# Patient Record
Sex: FEMALE | Race: BLACK OR AFRICAN AMERICAN | Employment: FULL TIME | URBAN - METROPOLITAN AREA
[De-identification: names, ages, dates, MRNs, and addresses within clinical notes are randomized per-mention and may not be internally consistent; named-entity substitution may affect disease eponyms.]

---

## 2023-02-11 ENCOUNTER — APPOINTMENT (OUTPATIENT)
Dept: CT IMAGING | Age: 38
End: 2023-02-11
Attending: NURSE PRACTITIONER
Payer: COMMERCIAL

## 2023-02-11 ENCOUNTER — HOSPITAL ENCOUNTER (EMERGENCY)
Age: 38
Discharge: HOME | End: 2023-02-11
Attending: EMERGENCY MEDICINE
Payer: COMMERCIAL

## 2023-02-11 VITALS
DIASTOLIC BLOOD PRESSURE: 96 MMHG | BODY MASS INDEX: 35.52 KG/M2 | TEMPERATURE: 97.9 F | OXYGEN SATURATION: 98 % | RESPIRATION RATE: 14 BRPM | SYSTOLIC BLOOD PRESSURE: 141 MMHG | WEIGHT: 221 LBS | HEART RATE: 87 BPM | HEIGHT: 66 IN

## 2023-02-11 DIAGNOSIS — L02.215 PERINEAL ABSCESS: Primary | ICD-10-CM

## 2023-02-11 LAB
ALBUMIN SERPL-MCNC: 3.9 G/DL (ref 3.5–5.2)
ALBUMIN/GLOB SERPL: 1.1 (ref 1.1–2.2)
ALP SERPL-CCNC: 57 U/L (ref 35–104)
ALT SERPL-CCNC: 8 U/L (ref 10–35)
ANION GAP SERPL CALC-SCNC: 10 MMOL/L (ref 5–15)
AST SERPL-CCNC: 11 U/L (ref 10–35)
BASOPHILS # BLD: 0 K/UL (ref 0–1)
BASOPHILS NFR BLD: 0 % (ref 0–1)
BILIRUB SERPL-MCNC: 0.3 MG/DL (ref 0.2–1)
BUN SERPL-MCNC: 13 MG/DL (ref 6–20)
BUN/CREAT SERPL: 18 (ref 12–20)
CALCIUM SERPL-MCNC: 9.1 MG/DL (ref 8.6–10)
CHLORIDE SERPL-SCNC: 104 MMOL/L (ref 98–107)
CO2 SERPL-SCNC: 28 MMOL/L (ref 22–29)
CREAT SERPL-MCNC: 0.71 MG/DL (ref 0.5–0.9)
DIFFERENTIAL METHOD BLD: ABNORMAL
EOSINOPHIL # BLD: 0.3 K/UL (ref 0–0.4)
EOSINOPHIL NFR BLD: 4 %
ERYTHROCYTE [DISTWIDTH] IN BLOOD BY AUTOMATED COUNT: 16.8 % (ref 11.5–14.5)
GLOBULIN SER CALC-MCNC: 3.6 G/DL (ref 2–4)
GLUCOSE SERPL-MCNC: 128 MG/DL (ref 65–100)
HCT VFR BLD AUTO: 39.3 % (ref 35–47)
HGB BLD-MCNC: 11.9 G/DL (ref 11.5–16)
IMM GRANULOCYTES # BLD AUTO: 0.1 K/UL (ref 0–0.04)
IMM GRANULOCYTES NFR BLD AUTO: 1 % (ref 0–0.5)
LYMPHOCYTES # BLD: 3.2 K/UL (ref 0.8–3.5)
LYMPHOCYTES NFR BLD: 41 % (ref 12–49)
MCH RBC QN AUTO: 24.2 PG (ref 26–34)
MCHC RBC AUTO-ENTMCNC: 30.3 G/DL (ref 30–36.5)
MCV RBC AUTO: 80 FL (ref 80–99)
MONOCYTES # BLD: 0.9 K/UL (ref 0–1)
MONOCYTES NFR BLD: 11 % (ref 5–13)
NEUTS SEG # BLD: 3.3 K/UL (ref 1.8–8)
NEUTS SEG NFR BLD: 43 % (ref 32–75)
NRBC # BLD: 0 K/UL (ref 0–0.01)
NRBC BLD-RTO: 0 PER 100 WBC
PLATELET # BLD AUTO: 282 K/UL (ref 150–400)
PMV BLD AUTO: 9.5 FL (ref 8.9–12.9)
POTASSIUM SERPL-SCNC: 4.6 MMOL/L (ref 3.5–5.1)
PROT SERPL-MCNC: 7.5 G/DL (ref 6.4–8.3)
RBC # BLD AUTO: 4.91 M/UL (ref 3.8–5.2)
SODIUM SERPL-SCNC: 142 MMOL/L (ref 136–145)
WBC # BLD AUTO: 7.7 K/UL (ref 3.6–11)

## 2023-02-11 PROCEDURE — 74011250637 HC RX REV CODE- 250/637: Performed by: NURSE PRACTITIONER

## 2023-02-11 PROCEDURE — 85025 COMPLETE CBC W/AUTO DIFF WBC: CPT

## 2023-02-11 PROCEDURE — 36415 COLL VENOUS BLD VENIPUNCTURE: CPT

## 2023-02-11 PROCEDURE — 74011000250 HC RX REV CODE- 250: Performed by: NURSE PRACTITIONER

## 2023-02-11 PROCEDURE — 72193 CT PELVIS W/DYE: CPT

## 2023-02-11 PROCEDURE — 96374 THER/PROPH/DIAG INJ IV PUSH: CPT

## 2023-02-11 PROCEDURE — 74011250636 HC RX REV CODE- 250/636: Performed by: NURSE PRACTITIONER

## 2023-02-11 PROCEDURE — 80053 COMPREHEN METABOLIC PANEL: CPT

## 2023-02-11 PROCEDURE — 99285 EMERGENCY DEPT VISIT HI MDM: CPT

## 2023-02-11 PROCEDURE — 74011000636 HC RX REV CODE- 636: Performed by: EMERGENCY MEDICINE

## 2023-02-11 RX ORDER — DOXYCYCLINE HYCLATE 100 MG
100 TABLET ORAL 2 TIMES DAILY
Qty: 14 TABLET | Refills: 0 | Status: SHIPPED | OUTPATIENT
Start: 2023-02-11 | End: 2023-02-18

## 2023-02-11 RX ORDER — DOCUSATE SODIUM 100 MG/1
100 CAPSULE, LIQUID FILLED ORAL 2 TIMES DAILY
Qty: 30 CAPSULE | Refills: 0 | Status: SHIPPED | OUTPATIENT
Start: 2023-02-11 | End: 2023-02-26

## 2023-02-11 RX ORDER — KETOROLAC TROMETHAMINE 30 MG/ML
15 INJECTION, SOLUTION INTRAMUSCULAR; INTRAVENOUS
Status: COMPLETED | OUTPATIENT
Start: 2023-02-11 | End: 2023-02-11

## 2023-02-11 RX ORDER — ACETAMINOPHEN 500 MG
1000 TABLET ORAL ONCE
Status: COMPLETED | OUTPATIENT
Start: 2023-02-11 | End: 2023-02-11

## 2023-02-11 RX ORDER — OXYCODONE HYDROCHLORIDE 10 MG/1
5 TABLET ORAL
Qty: 10 TABLET | Refills: 0 | Status: SHIPPED | OUTPATIENT
Start: 2023-02-11 | End: 2023-02-16

## 2023-02-11 RX ORDER — IBUPROFEN 600 MG/1
600 TABLET ORAL
Qty: 20 TABLET | Refills: 0 | Status: SHIPPED | OUTPATIENT
Start: 2023-02-11

## 2023-02-11 RX ORDER — DOXYCYCLINE HYCLATE 100 MG
100 TABLET ORAL EVERY 12 HOURS
Status: DISCONTINUED | OUTPATIENT
Start: 2023-02-11 | End: 2023-02-11 | Stop reason: HOSPADM

## 2023-02-11 RX ADMIN — Medication 3 ML: at 16:40

## 2023-02-11 RX ADMIN — KETOROLAC TROMETHAMINE 15 MG: 30 INJECTION, SOLUTION INTRAMUSCULAR; INTRAVENOUS at 16:00

## 2023-02-11 RX ADMIN — IOPAMIDOL 100 ML: 755 INJECTION, SOLUTION INTRAVENOUS at 16:11

## 2023-02-11 RX ADMIN — ACETAMINOPHEN 1000 MG: 500 TABLET ORAL at 15:58

## 2023-02-11 RX ADMIN — DOXYCYCLINE HYCLATE 100 MG: 100 TABLET, COATED ORAL at 18:31

## 2023-02-11 NOTE — Clinical Note
[address]  [facility] EMERGENCY DEPT  [address] [id number]  [phone]    Work/School Note    Date: 2/11/2023    To Whom It May concern:    Vivi Elaine was seen and treated today in the emergency room by the following provider(s):  Attending Provider: Blake Flower DO  Nurse Practitioner: Joel Mcgowan, Mira Goltz, NP. Vivi Elaine is excused from work/school on 02/11/23 and 02/12/23. She is medically clear to return to work/school on 2/13/2023.        Sincerely,          Darrell Cortes NP

## 2023-02-11 NOTE — ED NOTES
Pt given discharge instructions, patient education, 3 prescriptions and follow-up information. Pt verbalizing understanding. All questions answered. Pt discharge to home in private vehicle, ambulatory. Pt A&Ox4, RA, pain controlled.

## 2023-02-11 NOTE — DISCHARGE INSTRUCTIONS
You may alternate Tylenol with ibuprofen every 4 hours to help manage your pain. I have sent you home with a prescription for doxycycline this is an antibiotic please consider eating 1 yogurt once a day prior to taking antibiotic and take it fully as prescribed. Do not save the antibiotic. You may perform sitz bath's to help ease the discomfort 2-3 times daily to help encourage the abscess to drain. If you have any worsening of symptoms, fever, chills, intolerable pain please return to a main ER.

## 2023-02-11 NOTE — ED TRIAGE NOTES
Pt. Arrives to er with complaints of Hemorrhoid pain that started Wednesday. pt reports using preporation h, and witch hazel both causing more pain. Pt reports aching pain but when medication is applied it is more of a burning sensation. 6/10 pain.

## 2023-02-11 NOTE — ED PROVIDER NOTES
75-year-old female with no significant past medical history presents ambulatory to the emergency center for evaluation of hemorrhoid pain. Patient states that she noticed that on Wednesday, started using over-the-counter Preparation H and witch hazel which both made the hemorrhoid burn and more painful. Patient states that she then switched over to just using wet wipes after going to the bathroom. Denies difficulty with constipation, denies blood in her stool. Patient denies previous history of difficulty with hemorrhoids. Patient states that she had applied ice while she was at home which helped ease the pain, however she was unable to keep this on due to walking around at work. Patient states that on Thursday she felt like she had a fever/chills, did not take her temperature but states that she wiped herself down with alcohol and Friday felt better. Today patient denies any fever, chills, difficulty urinating, dysuria, constipation, melena or hematochezia. Patient denies tobacco, alcohol or illicit drug use. History reviewed. No pertinent past medical history. No past surgical history on file. History reviewed. No pertinent family history.     Social History     Socioeconomic History    Marital status: SINGLE     Spouse name: Not on file    Number of children: Not on file    Years of education: Not on file    Highest education level: Not on file   Occupational History    Not on file   Tobacco Use    Smoking status: Not on file    Smokeless tobacco: Not on file   Substance and Sexual Activity    Alcohol use: Not on file    Drug use: Not on file    Sexual activity: Not on file   Other Topics Concern    Not on file   Social History Narrative    Not on file     Social Determinants of Health     Financial Resource Strain: Not on file   Food Insecurity: Not on file   Transportation Needs: Not on file   Physical Activity: Not on file   Stress: Not on file   Social Connections: Not on file   Intimate Partner Violence: Not on file   Housing Stability: Not on file         ALLERGIES: Patient has no known allergies. Review of Systems   Constitutional:  Positive for chills and fever. Respiratory: Negative. Cardiovascular: Negative. Gastrointestinal:  Positive for rectal pain. Negative for anal bleeding, blood in stool and constipation. Hemorrhoid- external   Genitourinary:  Negative for difficulty urinating, pelvic pain and vaginal discharge. Vitals:    02/11/23 1514 02/11/23 1518 02/11/23 1523 02/11/23 1829   BP: (!) 162/116 (!) 162/116  (!) 141/96   Pulse:  87     Resp:  14     Temp:  97.9 °F (36.6 °C)     SpO2: 98% 97% 98%    Weight:  100.2 kg (221 lb)     Height:  5' 6\" (1.676 m)              Physical Exam  Vitals and nursing note reviewed. Exam conducted with a chaperone present Dee Raines RN). Constitutional:       General: She is not in acute distress. Appearance: Normal appearance. She is not ill-appearing. HENT:      Head: Normocephalic. Nose: Nose normal.   Cardiovascular:      Rate and Rhythm: Normal rate. Pulmonary:      Effort: Pulmonary effort is normal. No respiratory distress. Abdominal:      General: There is no distension. Genitourinary:     Vagina: Vaginal discharge present. Rectum: Mass and external hemorrhoid present. No tenderness, anal fissure or internal hemorrhoid. Normal anal tone. Comments: ? Possible abscess extending from right labia into the edge of the rectum. Flattened and protruding in the perineum area between the vagina and rectum approximately 2 cm. White discharge noted inside vagina. Question if this isn't drainage from the abscess inside the labia. White purulent discharge. Musculoskeletal:         General: Normal range of motion. Cervical back: Normal range of motion. Skin:     General: Skin is dry. Neurological:      Mental Status: She is alert and oriented to person, place, and time.    Psychiatric:         Mood and Affect: Mood normal.        Medical Decision Making  Differential diagnosis includes: external hemorrhoid vs strangulated hemorrhoid vs bartholin cyst vs labial abscess    This is a 41 y/o female  who presents to the emergency room with complaints of hemorrhoid. After initial assessment the following was completed:    1. Previous notes (external to Emergency room) were reviewed: none    2. History was obtained by: Patient    3. Chronic medical issues affecting this visit: none    4. I ordered the following tests, followed by review of final reads by lab and radiology: cbc, cmp, ct pel with contrast    5. I considered ordering: n/a    6. Any intervention/ surgery needed: tylenol, toradol and LET  Site visualized with Claudia Turner RN chaperone. Concerning for possible abscess extending from right labia is purulent white discharge was noted in the vagina. Rectal tone is intact, no internal hemorrhoid, no bleeding at this time, possible abscess approximately 3 cm flattened with a white head noted in the perineum. 7. Social determinants of health: None    8 Final diagnosis: Perineal abscess. Medications prescribed: Doxycycline, Roxicodone, Colace, ibuprofen and topical lidocaine gel. 9. Disposition: Patient reevaluated discussed all labs and imaging, lab work remarkable for acute leukocytosis, no anemia, no electrolyte abnormality and no kidney function impairment. CT of the pelvis with contrast showing no labial or rectal/perirectal abscess. Discussed plan to take multiple sitz bath's throughout the day, topical lidocaine gel as needed, take all antibiotics as prescribed, to call OB/GYN tomorrow and schedule follow-up appointment for next week, patient states that she is established with an OB/GYN and will call for an appointment. Patient given adequate pain control at home, given strict return precautions for worsening of symptoms, verbalizes understanding and agrees with plan.   Patient remains hemodynamically stable at this time, verbalizes understanding and agrees with plan. Problems Addressed:  Perineal abscess: acute illness or injury    Amount and/or Complexity of Data Reviewed  Labs: ordered. Radiology: ordered. Discussion of management or test interpretation with external provider(s): Discussed case with Dr. Lee Ann Horton along with visualization. Risk  OTC drugs. Prescription drug management. Procedures    VITAL SIGNS:  Patient Vitals for the past 4 hrs:   BP   02/11/23 1829 (!) 141/96           LABS:  The Following labs have been ordered while in the emergency department and I have independently evaluated them. Recent Results (from the past 6 hour(s))   CBC WITH AUTOMATED DIFF    Collection Time: 02/11/23  3:44 PM   Result Value Ref Range    WBC 7.7 3.6 - 11.0 K/uL    RBC 4.91 3.80 - 5.20 M/uL    HGB 11.9 11.5 - 16.0 g/dL    HCT 39.3 35.0 - 47.0 %    MCV 80.0 80.0 - 99.0 FL    MCH 24.2 (L) 26.0 - 34.0 PG    MCHC 30.3 30.0 - 36.5 g/dL    RDW 16.8 (H) 11.5 - 14.5 %    PLATELET 130 035 - 629 K/uL    MPV 9.5 8.9 - 12.9 FL    NRBC 0.0 0  WBC    ABSOLUTE NRBC 0.00 0.00 - 0.01 K/uL    NEUTROPHILS 43 32 - 75 %    LYMPHOCYTES 41 12 - 49 %    MONOCYTES 11 5 - 13 %    EOSINOPHILS 4 (L) 7 %    BASOPHILS 0 0 - 1 %    IMMATURE GRANULOCYTES 1 (H) 0 - 0.5 %    ABS. NEUTROPHILS 3.3 1.8 - 8.0 K/UL    ABS. LYMPHOCYTES 3.2 0.8 - 3.5 K/UL    ABS. MONOCYTES 0.9 0.0 - 1.0 K/UL    ABS. EOSINOPHILS 0.3 0.0 - 0.4 K/UL    ABS. BASOPHILS 0.0 0 - 1 K/UL    ABS. IMM.  GRANS. 0.1 (H) 0.00 - 0.04 K/UL    DF AUTOMATED     METABOLIC PANEL, COMPREHENSIVE    Collection Time: 02/11/23  3:44 PM   Result Value Ref Range    Sodium 142 136 - 145 mmol/L    Potassium 4.6 3.5 - 5.1 mmol/L    Chloride 104 98 - 107 mmol/L    CO2 28 22 - 29 mmol/L    Anion gap 10 5 - 15 mmol/L    Glucose 128 (H) 65 - 100 mg/dL    BUN 13 6 - 20 MG/DL    Creatinine 0.71 0.50 - 0.90 MG/DL    BUN/Creatinine ratio 18 12 - 20      eGFR >60 >60 ml/min/1.73m2 Calcium 9.1 8.6 - 10.0 MG/DL    Bilirubin, total 0.3 0.2 - 1.0 MG/DL    ALT (SGPT) 8 (L) 10 - 35 U/L    AST (SGOT) 11 10 - 35 U/L    Alk. phosphatase 57 35 - 104 U/L    Protein, total 7.5 6.4 - 8.3 g/dL    Albumin 3.9 3.5 - 5.2 g/dL    Globulin 3.6 2.0 - 4.0 g/dL    A-G Ratio 1.1 1.1 - 2.2            IMAGING:  The Following imaging studies have been ordered while in the emergency department and I have reviewed them. CT PELV W CONT   Final Result   No labial or rectal/perirectal abscess. Medications During Visit:  I ordered/approved the ordering of the following medicines for the patient while in the emergency department. Medications   doxycycline (VIBRA-TABS) tablet 100 mg (100 mg Oral Given 2/11/23 1831)   acetaminophen (TYLENOL) tablet 1,000 mg (1,000 mg Oral Given 2/11/23 1558)   ketorolac (TORADOL) injection 15 mg (15 mg IntraVENous Given 2/11/23 1600)   iopamidoL (ISOVUE-370) 370 mg iodine /mL (76 %) injection 100 mL (100 mL IntraVENous Given 2/11/23 1611)   lidocaine/EPINEPHrine/tetracaine (L.E.T) topical gel (3 mL Topical Given 2/11/23 1640)       IMPRESSION:  1. Perineal abscess        DISPOSITION:  Discharged      Discharge Medication List as of 2/11/2023  6:33 PM        START taking these medications    Details   doxycycline (VIBRA-TABS) 100 mg tablet Take 1 Tablet by mouth two (2) times a day for 7 days. , Normal, Disp-14 Tablet, R-0      oxyCODONE IR (ROXICODONE) 10 mg tab immediate release tablet Take 0.5 Tablets by mouth every six (6) hours as needed for Pain for up to 10 doses. Max Daily Amount: 20 mg., Normal, Disp-10 Tablet, R-0      docusate sodium (Colace) 100 mg capsule Take 1 Capsule by mouth two (2) times a day for 30 doses. , Normal, Disp-30 Capsule, R-0      ibuprofen (MOTRIN) 600 mg tablet Take 1 Tablet by mouth every six (6) hours as needed for Pain., Normal, Disp-20 Tablet, R-0                Follow-up Information       Follow up With Specialties Details Why Contact Info    Please call and schedule a follow-up appointment with your OB/GYN. You need a follow-up appointment for your perineal abscess. [facility]  Call  Please call Monday first thing and schedule a follow-up appointment for evaluation of your perineal abscess. , For wound re-check [address] [facility]              The patient is asked to follow-up with their primary care provider in the next several days. They are to call tomorrow for an appointment. The patient is asked to return promptly for any increased concerns or worsening of symptoms. They can return to this emergency department or any other emergency department.     Maria Elena Dumont NP  6:34 PM

## 2024-09-10 ENCOUNTER — HOSPITAL ENCOUNTER (EMERGENCY)
Facility: HOSPITAL | Age: 39
Discharge: HOME | End: 2024-09-10
Attending: STUDENT IN AN ORGANIZED HEALTH CARE EDUCATION/TRAINING PROGRAM
Payer: COMMERCIAL

## 2024-09-10 ENCOUNTER — APPOINTMENT (OUTPATIENT)
Facility: HOSPITAL | Age: 39
End: 2024-09-10
Payer: COMMERCIAL

## 2024-09-10 VITALS
TEMPERATURE: 98.8 F | DIASTOLIC BLOOD PRESSURE: 103 MMHG | WEIGHT: 249.12 LBS | HEART RATE: 82 BPM | OXYGEN SATURATION: 98 % | HEIGHT: 66 IN | SYSTOLIC BLOOD PRESSURE: 148 MMHG | RESPIRATION RATE: 18 BRPM | BODY MASS INDEX: 40.04 KG/M2

## 2024-09-10 DIAGNOSIS — S39.012A BACK STRAIN, INITIAL ENCOUNTER: Primary | ICD-10-CM

## 2024-09-10 PROCEDURE — 96372 THER/PROPH/DIAG INJ SC/IM: CPT

## 2024-09-10 PROCEDURE — 6370000000 HC RX 637 (ALT 250 FOR IP): Performed by: STUDENT IN AN ORGANIZED HEALTH CARE EDUCATION/TRAINING PROGRAM

## 2024-09-10 PROCEDURE — 71046 X-RAY EXAM CHEST 2 VIEWS: CPT

## 2024-09-10 PROCEDURE — 99284 EMERGENCY DEPT VISIT MOD MDM: CPT

## 2024-09-10 PROCEDURE — 6360000002 HC RX W HCPCS: Performed by: STUDENT IN AN ORGANIZED HEALTH CARE EDUCATION/TRAINING PROGRAM

## 2024-09-10 RX ORDER — LIDOCAINE 4 G/G
1 PATCH TOPICAL DAILY
Status: DISCONTINUED | OUTPATIENT
Start: 2024-09-10 | End: 2024-09-10 | Stop reason: HOSPADM

## 2024-09-10 RX ORDER — KETOROLAC TROMETHAMINE 30 MG/ML
15 INJECTION, SOLUTION INTRAMUSCULAR; INTRAVENOUS
Status: COMPLETED | OUTPATIENT
Start: 2024-09-10 | End: 2024-09-10

## 2024-09-10 RX ORDER — METHOCARBAMOL 750 MG/1
750 TABLET, FILM COATED ORAL 2 TIMES DAILY PRN
Qty: 10 TABLET | Refills: 0 | Status: SHIPPED | OUTPATIENT
Start: 2024-09-10 | End: 2024-09-15

## 2024-09-10 RX ORDER — LIDOCAINE 4 G/G
1 PATCH TOPICAL DAILY
Qty: 10 PATCH | Refills: 0 | Status: SHIPPED | OUTPATIENT
Start: 2024-09-10 | End: 2024-10-10

## 2024-09-10 RX ADMIN — KETOROLAC TROMETHAMINE 15 MG: 30 INJECTION, SOLUTION INTRAMUSCULAR at 19:00

## 2024-09-10 ASSESSMENT — PAIN - FUNCTIONAL ASSESSMENT
PAIN_FUNCTIONAL_ASSESSMENT: 0-10
PAIN_FUNCTIONAL_ASSESSMENT: 0-10
PAIN_FUNCTIONAL_ASSESSMENT: PREVENTS OR INTERFERES SOME ACTIVE ACTIVITIES AND ADLS

## 2024-09-10 ASSESSMENT — PAIN DESCRIPTION - PAIN TYPE
TYPE: ACUTE PAIN
TYPE: ACUTE PAIN

## 2024-09-10 ASSESSMENT — PAIN SCALES - GENERAL
PAINLEVEL_OUTOF10: 4
PAINLEVEL_OUTOF10: 7
PAINLEVEL_OUTOF10: 7

## 2024-09-10 ASSESSMENT — PAIN DESCRIPTION - DESCRIPTORS
DESCRIPTORS: PATIENT UNABLE TO DESCRIBE
DESCRIPTORS: ACHING
DESCRIPTORS: ACHING

## 2024-09-10 ASSESSMENT — PAIN DESCRIPTION - LOCATION
LOCATION: BACK

## 2024-09-10 ASSESSMENT — LIFESTYLE VARIABLES
HOW OFTEN DO YOU HAVE A DRINK CONTAINING ALCOHOL: MONTHLY OR LESS
HOW MANY STANDARD DRINKS CONTAINING ALCOHOL DO YOU HAVE ON A TYPICAL DAY: 1 OR 2

## 2024-09-10 ASSESSMENT — PAIN DESCRIPTION - ORIENTATION
ORIENTATION: ANTERIOR
ORIENTATION: MID
ORIENTATION: LEFT;RIGHT;UPPER

## 2024-10-21 ENCOUNTER — HOSPITAL ENCOUNTER (EMERGENCY)
Facility: HOSPITAL | Age: 39
Discharge: HOME | End: 2024-10-21
Attending: EMERGENCY MEDICINE
Payer: COMMERCIAL

## 2024-10-21 VITALS
TEMPERATURE: 98.5 F | RESPIRATION RATE: 19 BRPM | WEIGHT: 244 LBS | SYSTOLIC BLOOD PRESSURE: 159 MMHG | OXYGEN SATURATION: 100 % | DIASTOLIC BLOOD PRESSURE: 106 MMHG | HEIGHT: 66 IN | HEART RATE: 99 BPM | BODY MASS INDEX: 39.21 KG/M2

## 2024-10-21 DIAGNOSIS — T63.441A BEE STING REACTION, ACCIDENTAL OR UNINTENTIONAL, INITIAL ENCOUNTER: Primary | ICD-10-CM

## 2024-10-21 PROCEDURE — 6370000000 HC RX 637 (ALT 250 FOR IP): Performed by: NURSE PRACTITIONER

## 2024-10-21 PROCEDURE — 99283 EMERGENCY DEPT VISIT LOW MDM: CPT

## 2024-10-21 RX ORDER — HYDROXYZINE HYDROCHLORIDE 50 MG/1
50 TABLET, FILM COATED ORAL EVERY 6 HOURS PRN
Qty: 40 TABLET | Refills: 0 | Status: SHIPPED | OUTPATIENT
Start: 2024-10-21 | End: 2024-10-31

## 2024-10-21 RX ORDER — HYDROXYZINE HYDROCHLORIDE 25 MG/1
25 TABLET, FILM COATED ORAL ONCE
Status: COMPLETED | OUTPATIENT
Start: 2024-10-21 | End: 2024-10-21

## 2024-10-21 RX ORDER — IBUPROFEN 600 MG/1
600 TABLET, FILM COATED ORAL EVERY 6 HOURS PRN
Qty: 30 TABLET | Refills: 0 | Status: SHIPPED | OUTPATIENT
Start: 2024-10-21

## 2024-10-21 RX ORDER — IBUPROFEN 800 MG/1
800 TABLET, FILM COATED ORAL
Status: COMPLETED | OUTPATIENT
Start: 2024-10-21 | End: 2024-10-21

## 2024-10-21 RX ORDER — TRIAMCINOLONE ACETONIDE 0.25 MG/G
OINTMENT TOPICAL
Qty: 80 G | Refills: 1 | Status: SHIPPED | OUTPATIENT
Start: 2024-10-21 | End: 2024-10-28

## 2024-10-21 RX ORDER — DIPHENHYDRAMINE HCL 25 MG
25 CAPSULE ORAL ONCE
Status: DISCONTINUED | OUTPATIENT
Start: 2024-10-21 | End: 2024-10-21

## 2024-10-21 RX ORDER — METHYLPREDNISOLONE 4 MG/1
TABLET ORAL
Qty: 1 KIT | Refills: 0 | Status: SHIPPED | OUTPATIENT
Start: 2024-10-21 | End: 2024-10-27

## 2024-10-21 RX ADMIN — HYDROXYZINE HYDROCHLORIDE 25 MG: 25 TABLET, FILM COATED ORAL at 20:02

## 2024-10-21 RX ADMIN — IBUPROFEN 800 MG: 800 TABLET, FILM COATED ORAL at 20:01

## 2024-10-21 ASSESSMENT — ENCOUNTER SYMPTOMS: COLOR CHANGE: 1

## 2024-10-21 ASSESSMENT — LIFESTYLE VARIABLES
HOW MANY STANDARD DRINKS CONTAINING ALCOHOL DO YOU HAVE ON A TYPICAL DAY: PATIENT DOES NOT DRINK
HOW OFTEN DO YOU HAVE A DRINK CONTAINING ALCOHOL: NEVER

## 2024-10-21 ASSESSMENT — PAIN SCALES - GENERAL: PAINLEVEL_OUTOF10: 5

## 2024-10-21 ASSESSMENT — PAIN - FUNCTIONAL ASSESSMENT: PAIN_FUNCTIONAL_ASSESSMENT: 0-10

## 2024-10-21 NOTE — DISCHARGE INSTRUCTIONS
How can you care for yourself at home?  Do not scratch or rub the skin where the sting or bite occurred.  Put a cold pack or ice cube on the area. Put a thin cloth between the ice and your skin. For some people, a paste of baking soda mixed with a little water helps relieve pain and decrease the reaction.  Take an over-the-counter antihistamine to help relieve swelling, redness, and itching. Calamine lotion or hydrocortisone cream may also help. Do not give antihistamines to your child unless you have checked with the doctor first. Read and follow all instructions on the label.  Be safe with medicines. If your doctor prescribed medicine for your allergy, take it exactly as prescribed. Call your doctor or nurse advice line if you think you are having a problem with your medicine. You will get more details on the specific medicines your doctor prescribes.  Go to the emergency room anytime you have a severe reaction. Go even if you have given yourself epinephrine and are feeling better. Symptoms can come back.

## 2024-10-21 NOTE — ED TRIAGE NOTES
Pt arrives POV c/o bee sting right elbow around 1400  yesterday and the inflammation just keeps getting worse. Pt denies throat tightness and itchiness. Pt denies CP, SOB, N/V/D. Pt denies any medications PTA.

## 2024-10-22 NOTE — ED PROVIDER NOTES
[facility] EMERGENCY DEPT  EMERGENCY DEPARTMENT ENCOUNTER      Pt Name: Esther Zarate  MRN: [id number]  Birthdate 1985  Date of evaluation: 10/21/2024  Provider: JHOAN Garza NP    CHIEF COMPLAINT       Chief Complaint   Patient presents with    Insect Bite         HISTORY OF PRESENT ILLNESS   (Location/Symptom, Timing/Onset, Context/Setting, Quality, Duration, Modifying Factors, Severity)  Note limiting factors.   The history is provided by the patient. No  was used.     Esther Zarate is a 39 y.o. female with Hx of appendicitis who presents ambulatory by herself to [facility] ED with cc of insect sting.     Pt reports that she was stung on the RIGHT elbow by a bee yesterday afternoon. Since then increased swelling, warmth, and itching. No medication PTA for symptoms.  Has not had an history of anaphylactic reaction to insect stings or bites in the past.    Recently had appendectomy, states she just completed a course of outpatient antibiotics.  Denies any recent fevers, abdominal pain, vomiting, diarrhea or urinary concerns.  Denies chance of pregnancy.  Denies tobacco/ ETOH/ substance abuse.       PCP: No primary care provider on file.    There are no other complaints, changes or physical findings at this time.      Review of External Medical Records:     Nursing Notes were reviewed.    REVIEW OF SYSTEMS    (2-9 systems for level 4, 10 or more for level 5)     Review of Systems   Musculoskeletal:  Positive for arthralgias and joint swelling.   Skin:  Positive for color change.       Except as noted above the remainder of the review of systems was reviewed and negative.       PAST MEDICAL HISTORY   No past medical history on file.      SURGICAL HISTORY     No past surgical history on file.      CURRENT MEDICATIONS       Discharge Medication List as of 10/21/2024  7:59 PM          ALLERGIES     Patient has no known allergies.    FAMILY HISTORY     No family history on file.

## 2025-01-03 ENCOUNTER — HOSPITAL ENCOUNTER (EMERGENCY)
Facility: HOSPITAL | Age: 40
Discharge: HOME | End: 2025-01-03
Attending: EMERGENCY MEDICINE
Payer: COMMERCIAL

## 2025-01-03 VITALS
DIASTOLIC BLOOD PRESSURE: 106 MMHG | BODY MASS INDEX: 38.09 KG/M2 | OXYGEN SATURATION: 98 % | TEMPERATURE: 98.8 F | HEART RATE: 92 BPM | WEIGHT: 237 LBS | HEIGHT: 66 IN | RESPIRATION RATE: 20 BRPM | SYSTOLIC BLOOD PRESSURE: 150 MMHG

## 2025-01-03 DIAGNOSIS — J40 BRONCHITIS: Primary | ICD-10-CM

## 2025-01-03 DIAGNOSIS — J02.9 ACUTE PHARYNGITIS, UNSPECIFIED ETIOLOGY: ICD-10-CM

## 2025-01-03 LAB
FLUAV RNA SPEC QL NAA+PROBE: NOT DETECTED
FLUBV RNA SPEC QL NAA+PROBE: NOT DETECTED
SARS-COV-2 RNA RESP QL NAA+PROBE: NOT DETECTED
SOURCE: NORMAL

## 2025-01-03 PROCEDURE — 87636 SARSCOV2 & INF A&B AMP PRB: CPT

## 2025-01-03 PROCEDURE — 99283 EMERGENCY DEPT VISIT LOW MDM: CPT

## 2025-01-03 RX ORDER — AMOXICILLIN 500 MG/1
500 CAPSULE ORAL 3 TIMES DAILY
Qty: 30 CAPSULE | Refills: 0 | Status: SHIPPED | OUTPATIENT
Start: 2025-01-03 | End: 2025-01-13

## 2025-01-03 ASSESSMENT — ENCOUNTER SYMPTOMS
FACIAL SWELLING: 0
EYE DISCHARGE: 0
VOMITING: 0
DIARRHEA: 0
CHEST TIGHTNESS: 0
COUGH: 1
ABDOMINAL PAIN: 0
BACK PAIN: 0
NAUSEA: 0
SHORTNESS OF BREATH: 0
SORE THROAT: 1
EYE PAIN: 0

## 2025-01-03 ASSESSMENT — PAIN SCALES - GENERAL: PAINLEVEL_OUTOF10: 0

## 2025-01-03 ASSESSMENT — PAIN - FUNCTIONAL ASSESSMENT: PAIN_FUNCTIONAL_ASSESSMENT: 0-10

## 2025-01-03 NOTE — ED TRIAGE NOTES
Patient to triage with complaint of cough, congestion and sore throat that started a few days ago.

## 2025-01-03 NOTE — ED PROVIDER NOTES
Skin:  Negative for rash and wound.   Neurological:  Negative for dizziness, syncope, facial asymmetry and headaches.   Psychiatric/Behavioral:  Negative for agitation, behavioral problems and confusion.    All other systems reviewed and are negative.      Except as noted above the remainder of the review of systems was reviewed and negative.       PAST MEDICAL HISTORY   No past medical history on file.      SURGICAL HISTORY     No past surgical history on file.      CURRENT MEDICATIONS       Previous Medications    IBUPROFEN (IBU) 600 MG TABLET    Take 1 tablet by mouth every 6 hours as needed for Pain       ALLERGIES     Patient has no known allergies.    FAMILY HISTORY     No family history on file.       SOCIAL HISTORY       Social History     Socioeconomic History    Marital status: Single           PHYSICAL EXAM    (up to 7 for level 4, 8 or more for level 5)     ED Triage Vitals [01/03/25 1655]   BP Systolic BP Percentile Diastolic BP Percentile Temp Temp Source Pulse Respirations SpO2   (!) 150/106 -- -- 98.8 °F (37.1 °C) Oral 92 20 98 %      Height Weight - Scale         1.676 m (5' 6\") 107.5 kg (237 lb)             Body mass index is 38.25 kg/m².    Physical Exam  Vitals and nursing note reviewed.   Constitutional:       Appearance: She is normal weight.   HENT:      Head: Normocephalic and atraumatic.      Mouth/Throat:      Pharynx: Oropharyngeal exudate and posterior oropharyngeal erythema present.   Eyes:      Extraocular Movements: Extraocular movements intact.      Conjunctiva/sclera: Conjunctivae normal.      Pupils: Pupils are equal, round, and reactive to light.   Cardiovascular:      Rate and Rhythm: Normal rate and regular rhythm.      Pulses: Normal pulses.      Heart sounds: Normal heart sounds. No murmur heard.  Pulmonary:      Effort: Pulmonary effort is normal. No respiratory distress.      Breath sounds: No stridor. No wheezing.   Abdominal:      General: Abdomen is flat. Bowel sounds

## 2025-06-26 ENCOUNTER — HOSPITAL ENCOUNTER (EMERGENCY)
Facility: HOSPITAL | Age: 40
Discharge: HOME | End: 2025-06-26
Attending: EMERGENCY MEDICINE
Payer: COMMERCIAL

## 2025-06-26 VITALS
TEMPERATURE: 98.5 F | DIASTOLIC BLOOD PRESSURE: 106 MMHG | OXYGEN SATURATION: 99 % | RESPIRATION RATE: 18 BRPM | HEIGHT: 66 IN | WEIGHT: 239 LBS | HEART RATE: 88 BPM | SYSTOLIC BLOOD PRESSURE: 158 MMHG | BODY MASS INDEX: 38.41 KG/M2

## 2025-06-26 DIAGNOSIS — K64.9 HEMORRHOIDS, UNSPECIFIED HEMORRHOID TYPE: Primary | ICD-10-CM

## 2025-06-26 PROCEDURE — 99283 EMERGENCY DEPT VISIT LOW MDM: CPT

## 2025-06-26 RX ORDER — DOCUSATE SODIUM 100 MG/1
100 CAPSULE, LIQUID FILLED ORAL 2 TIMES DAILY
Qty: 28 CAPSULE | Refills: 0 | Status: SHIPPED | OUTPATIENT
Start: 2025-06-26 | End: 2025-07-10

## 2025-06-26 RX ORDER — LIDOCAINE 50 MG/G
OINTMENT TOPICAL
Qty: 50 G | Refills: 0 | Status: SHIPPED | OUTPATIENT
Start: 2025-06-26

## 2025-06-26 RX ORDER — HYDROCORTISONE ACETATE PRAMOXINE HCL 2.5; 1 G/100G; G/100G
CREAM TOPICAL 3 TIMES DAILY
Qty: 4 G | Refills: 0 | Status: SHIPPED | OUTPATIENT
Start: 2025-06-26 | End: 2025-07-06

## 2025-06-26 RX ORDER — HYDROCORTISONE ACETATE 25 MG/1
25 SUPPOSITORY RECTAL 2 TIMES DAILY
Qty: 30 SUPPOSITORY | Refills: 0 | Status: SHIPPED | OUTPATIENT
Start: 2025-06-26 | End: 2025-06-26

## 2025-06-26 ASSESSMENT — PAIN SCALES - GENERAL
PAINLEVEL_OUTOF10: 10
PAINLEVEL_OUTOF10: 10

## 2025-06-26 ASSESSMENT — PAIN - FUNCTIONAL ASSESSMENT
PAIN_FUNCTIONAL_ASSESSMENT: 0-10
PAIN_FUNCTIONAL_ASSESSMENT: 0-10

## 2025-06-26 ASSESSMENT — ENCOUNTER SYMPTOMS
COUGH: 0
ALLERGIC/IMMUNOLOGIC NEGATIVE: 1
DIARRHEA: 0
SHORTNESS OF BREATH: 0
BLOOD IN STOOL: 0
RECTAL PAIN: 1
EYES NEGATIVE: 1
BACK PAIN: 0
RHINORRHEA: 0
ABDOMINAL PAIN: 0
RESPIRATORY NEGATIVE: 1
NAUSEA: 0

## 2025-06-26 ASSESSMENT — LIFESTYLE VARIABLES
HOW MANY STANDARD DRINKS CONTAINING ALCOHOL DO YOU HAVE ON A TYPICAL DAY: PATIENT DECLINED
HOW OFTEN DO YOU HAVE A DRINK CONTAINING ALCOHOL: PATIENT DECLINED

## 2025-06-26 ASSESSMENT — PAIN DESCRIPTION - DESCRIPTORS
DESCRIPTORS: TENDER
DESCRIPTORS: DISCOMFORT

## 2025-06-26 ASSESSMENT — PAIN DESCRIPTION - LOCATION
LOCATION: BUTTOCKS;RECTUM
LOCATION: RECTUM

## 2025-06-26 NOTE — ED PROVIDER NOTES
[facility] EMERGENCY DEPARTMENT  EMERGENCY DEPARTMENT ENCOUNTER      Pt Name: Esther Zarate  MRN: [id number]  Birthdate 1985  Date of evaluation: 6/26/2025  Provider: YESSI Lewis    CHIEF COMPLAINT       Chief Complaint   Patient presents with    Rectal Pain         HISTORY OF PRESENT ILLNESS   (Location/Symptom, Timing/Onset, Context/Setting, Quality, Duration, Modifying Factors, Severity)  Note limiting factors.   Esther Zarate is a 39 y.o. female  who presents by private vehicle to ER with c/o Patient presents with:  Rectal Pain  Rectal pain x 3 days, no bowel movements, has been using preparation H and lidocaine cream. Denies fever or chills or abdominal pain.    She specifically denies any fevers, chills, nausea, vomiting, chest pain, shortness of breath, headache, rash, diarrhea, abdominal pain, urinary changes, sweating or weight loss.    PCP: No primary care provider on file.   PMHx significant for: No past medical history on file.   PSHx significant for: No past surgical history on file.  Social Hx: Tobacco use: Social History    Tobacco Use      Smoking status: Not on file      Smokeless tobacco: Not on file  ; EtOH use: The patient states she drinks 0 per week.; Illicit Drug use:     Allergies:  No Known Allergies    There are no other complaints, changes or physical findings at this time.               Review of External Medical Records:     Nursing Notes were reviewed.    REVIEW OF SYSTEMS    (2-9 systems for level 4, 10 or more for level 5)     Review of Systems   Constitutional: Negative.  Negative for activity change, appetite change, fatigue and fever.   HENT: Negative.  Negative for congestion, mouth sores and rhinorrhea.    Eyes: Negative.    Respiratory: Negative.  Negative for cough and shortness of breath.    Cardiovascular: Negative.  Negative for chest pain.   Gastrointestinal:  Positive for rectal pain. Negative for abdominal pain, blood in stool, diarrhea and nausea.   Endocrine:

## 2025-06-26 NOTE — ED TRIAGE NOTES
Patient ambulatory to triage with son. C/O rectal swelling x couple of days. Believes she may have a hemorrhoid, has history. Used preporation H and lidocaine without relief. Endorses intermittent nausea. LMB x3 days ago and was hard.

## 2025-06-27 RX ORDER — LIDOCAINE HCL 4% 4 G/100G
CREAM TOPICAL
Qty: 30 G | Refills: 0 | OUTPATIENT
Start: 2025-06-27

## 2025-06-27 NOTE — TELEPHONE ENCOUNTER
Received med change request from [facility] [place] pharmacy.    2 pt identifiers used. Spoke w/ [facility] pharmacy lulu Santiago who confirmed pt picked up the Lidocaine 5% ointment 6/26/25.

## 2025-06-27 NOTE — ED NOTES
Patient provided with discharge papers and explanation. Patient reports understanding. Opporunity for questions provided and clarified as needed. MD aware of patients discharge vitals, no concerns at this time. Patient is upright and ambulatory off unit    First encounter with pt is at discharge